# Patient Record
Sex: FEMALE | Race: BLACK OR AFRICAN AMERICAN | NOT HISPANIC OR LATINO | Employment: FULL TIME | URBAN - METROPOLITAN AREA
[De-identification: names, ages, dates, MRNs, and addresses within clinical notes are randomized per-mention and may not be internally consistent; named-entity substitution may affect disease eponyms.]

---

## 2020-04-19 ENCOUNTER — HOSPITAL ENCOUNTER (EMERGENCY)
Facility: CLINIC | Age: 25
Discharge: HOME | End: 2020-04-19
Attending: EMERGENCY MEDICINE | Admitting: EMERGENCY MEDICINE

## 2020-04-19 ENCOUNTER — APPOINTMENT (OUTPATIENT)
Dept: GENERAL RADIOLOGY | Facility: CLINIC | Age: 25
End: 2020-04-19
Attending: EMERGENCY MEDICINE

## 2020-04-19 VITALS
SYSTOLIC BLOOD PRESSURE: 91 MMHG | TEMPERATURE: 98.2 F | WEIGHT: 115 LBS | HEART RATE: 97 BPM | RESPIRATION RATE: 9 BRPM | DIASTOLIC BLOOD PRESSURE: 50 MMHG | OXYGEN SATURATION: 99 %

## 2020-04-19 DIAGNOSIS — T40.601A NARCOTIC OVERDOSE, ACCIDENTAL OR UNINTENTIONAL, INITIAL ENCOUNTER (H): ICD-10-CM

## 2020-04-19 LAB
ALBUMIN SERPL-MCNC: 3.5 G/DL (ref 3.4–5)
ALP SERPL-CCNC: 61 U/L (ref 40–150)
ALT SERPL W P-5'-P-CCNC: 28 U/L (ref 0–50)
ANION GAP SERPL CALCULATED.3IONS-SCNC: 6 MMOL/L (ref 3–14)
APAP SERPL-MCNC: <2 MG/L (ref 10–20)
APAP SERPL-MCNC: <2 MG/L (ref 10–20)
AST SERPL W P-5'-P-CCNC: 21 U/L (ref 0–45)
BILIRUB SERPL-MCNC: 0.3 MG/DL (ref 0.2–1.3)
BUN SERPL-MCNC: 7 MG/DL (ref 7–30)
CALCIUM SERPL-MCNC: 8.7 MG/DL (ref 8.5–10.1)
CHLORIDE SERPL-SCNC: 106 MMOL/L (ref 94–109)
CO2 SERPL-SCNC: 27 MMOL/L (ref 20–32)
CREAT SERPL-MCNC: 0.68 MG/DL (ref 0.52–1.04)
ETHANOL SERPL-MCNC: <0.01 G/DL
GFR SERPL CREATININE-BSD FRML MDRD: >90 ML/MIN/{1.73_M2}
GLUCOSE SERPL-MCNC: 151 MG/DL (ref 70–99)
POTASSIUM SERPL-SCNC: 3.1 MMOL/L (ref 3.4–5.3)
PROT SERPL-MCNC: 7 G/DL (ref 6.8–8.8)
SALICYLATES SERPL-MCNC: <2 MG/DL
SODIUM SERPL-SCNC: 139 MMOL/L (ref 133–144)

## 2020-04-19 PROCEDURE — 80329 ANALGESICS NON-OPIOID 1 OR 2: CPT | Performed by: EMERGENCY MEDICINE

## 2020-04-19 PROCEDURE — 25000128 H RX IP 250 OP 636: Performed by: EMERGENCY MEDICINE

## 2020-04-19 PROCEDURE — 80053 COMPREHEN METABOLIC PANEL: CPT | Performed by: EMERGENCY MEDICINE

## 2020-04-19 PROCEDURE — 96374 THER/PROPH/DIAG INJ IV PUSH: CPT

## 2020-04-19 PROCEDURE — 25000132 ZZH RX MED GY IP 250 OP 250 PS 637: Performed by: EMERGENCY MEDICINE

## 2020-04-19 PROCEDURE — 99285 EMERGENCY DEPT VISIT HI MDM: CPT | Mod: 25

## 2020-04-19 PROCEDURE — 80320 DRUG SCREEN QUANTALCOHOLS: CPT | Performed by: EMERGENCY MEDICINE

## 2020-04-19 PROCEDURE — 25800030 ZZH RX IP 258 OP 636: Performed by: EMERGENCY MEDICINE

## 2020-04-19 PROCEDURE — 93005 ELECTROCARDIOGRAM TRACING: CPT

## 2020-04-19 PROCEDURE — 71046 X-RAY EXAM CHEST 2 VIEWS: CPT

## 2020-04-19 PROCEDURE — 96361 HYDRATE IV INFUSION ADD-ON: CPT

## 2020-04-19 PROCEDURE — 25000132 ZZH RX MED GY IP 250 OP 250 PS 637

## 2020-04-19 RX ORDER — IBUPROFEN 200 MG
400 TABLET ORAL ONCE
Status: COMPLETED | OUTPATIENT
Start: 2020-04-19 | End: 2020-04-19

## 2020-04-19 RX ORDER — ONDANSETRON 2 MG/ML
4 INJECTION INTRAMUSCULAR; INTRAVENOUS ONCE
Status: COMPLETED | OUTPATIENT
Start: 2020-04-19 | End: 2020-04-19

## 2020-04-19 RX ORDER — ACETAMINOPHEN 325 MG/1
TABLET ORAL
Status: COMPLETED
Start: 2020-04-19 | End: 2020-04-19

## 2020-04-19 RX ADMIN — ACETAMINOPHEN 650 MG: 325 TABLET, FILM COATED ORAL at 20:52

## 2020-04-19 RX ADMIN — IBUPROFEN 400 MG: 200 TABLET, FILM COATED ORAL at 21:08

## 2020-04-19 RX ADMIN — SODIUM CHLORIDE 1000 ML: 9 INJECTION, SOLUTION INTRAVENOUS at 17:34

## 2020-04-19 RX ADMIN — ONDANSETRON 4 MG: 2 INJECTION INTRAMUSCULAR; INTRAVENOUS at 18:32

## 2020-04-19 ASSESSMENT — ENCOUNTER SYMPTOMS
VOMITING: 0
SHORTNESS OF BREATH: 1
APNEA: 1

## 2020-04-19 NOTE — ED AVS SNAPSHOT
[facility] Emergency Department  [address]  [facility] [address]  Phone:  [phone]  Fax:  [phone]                                    Mishel Acuna   MRN: [id number]    Department:  [facility] Emergency Department   Date of Visit:  4/19/2020           After Visit Summary Signature Page    I have received my discharge instructions, and my questions have been answered. I have discussed any challenges I see with this plan with the nurse or doctor.    ..........................................................................................................................................  Patient/Patient Representative Signature      ..........................................................................................................................................  Patient Representative Print Name and Relationship to Patient    ..................................................               ................................................  Date                                   Time    ..........................................................................................................................................  Reviewed by Signature/Title    ...................................................              ..............................................  Date                                               Time          22EPIC Rev 08/18

## 2020-04-19 NOTE — ED PROVIDER NOTES
History     Chief Complaint:  Overdose     The history is provided by the EMS personnel. The history is limited by the condition of the patient.      Mishel Acuna is a 25 year old female who presents with for an overdose. The patient, who is from [place], states that she was in the city visiting her Aunt. EMS states that the patient took 1 Percocet at a [employer] parking lot an hour prior to arrival (1630) with her sister. They state that the sister called EMS when the patient suddenly stopped breathing. EMS report that the patient was bagged for 7 minutes and given 0.5 mg IV of Narcan prior to arrival to the emergency department. The EMS voiced concern that the percocet was laced with Fentanyl based on other calls they have received recently. The patient currently complains of shortness of breath. She states that she was using the Percocet for recreational use and denies any suicidal ideations. She denies any other drug or alcohol use. The patient denies any recent illness or chance of pregnancy.      Allergies:  No known drug allergies    Medications:    The patient is not currently taking any prescribed medications.    Past Medical History:    The patient is not currently taking any prescribed medications.    Past Surgical History:    History reviewed. No pertinent surgical history.    Family History:    History reviewed. No pertinent family history.     Social History:  Smoking status: Unknown  Alcohol use: No  Drug use: Yes  The patient presents to the emergency department via EMS  PCP: No primary care provider on file.  Marital Status:  unkown    Review of Systems   Respiratory: Positive for apnea and shortness of breath.    Gastrointestinal: Negative for vomiting.   Psychiatric/Behavioral: Negative for self-injury.   10 point review of systems was obtained and negative other than mentioned above.    Physical Exam     Patient Vitals for the past 24 hrs:   BP Temp Temp src Pulse Heart Rate Resp SpO2 Weight    04/19/20 2200 91/50 -- -- 97 96 9 99 % --   04/19/20 2145 96/48 -- -- 98 96 16 100 % --   04/19/20 2130 103/52 -- -- 101 103 15 99 % --   04/19/20 2115 109/69 -- -- 92 100 11 99 % --   04/19/20 2100 107/72 -- -- 100 99 25 100 % --   04/19/20 2045 93/44 -- -- 95 96 9 98 % --   04/19/20 2030 97/53 -- -- 95 103 10 97 % --   04/19/20 2015 115/68 -- -- 99 104 8 94 % --   04/19/20 2000 116/74 -- -- 96 105 15 98 % --   04/19/20 1945 114/74 -- -- 93 99 14 100 % --   04/19/20 1915 108/62 -- -- 85 92 (!) 7 97 % --   04/19/20 1900 114/67 -- -- 96 93 10 92 % --   04/19/20 1845 113/76 -- -- 97 81 (!) 7 98 % --   04/19/20 1833 -- 98.2  F (36.8  C) Oral -- -- -- -- 52.2 kg (115 lb)   04/19/20 1832 119/70 -- -- 93 93 14 99 % --   04/19/20 1731 122/66 -- -- 127 127 -- 100 % --       Physical Exam   General: Sitting up in bed  Eyes:  The pupils are equal and round, pupils 5 mm and reactive bilaterally    Conjunctivae and sclerae are normal  ENT:    Moist mucous membranes  Neck:  Normal range of motion  CV:  Tachycardic rate and regular rhythm    Skin warm and well perfused   Resp:  Lungs are clear    Non-labored    No rales    No wheezing   MS:  Normal muscular tone  Skin:  No rash or acute skin lesions noted  Neuro:   Awake, alert.      Speech is normal and fluent.    Face is symmetric.     Moves all extremities equally  Psych: Anxious appearing    Emergency Department Course   ECG (17:33:46):  Rate 102 bpm. KS interval 120. QRS duration 82. QT/QTc 348/453. P-R-T axes 62 17 53. Sinus tachycardia. Otherwise normal ECG. Interpreted at 1740 by Yaritza Ivory MD.    Imaging:  Radiology findings were communicated with the patient who voiced understanding of the findings.    XR Chest 2 Views:   Impression: Negative chest.  As read by Radiology.    Laboratory:  Laboratory findings were communicated with the patient who voiced understanding of the findings.    Alcohol Level Blood: <0.01    CMP: Potassium: 3.1 (L), Glucose: 151  (H) o/w WNL (Creatinine 0.68)    Salicylate level: <2    Acetaminophen Level: <2     Interventions:  1734 NS 1L IV Bolus  1832 Zofran 4 mg IV    Emergency Department Course:  Past medical records, nursing notes, and vitals reviewed.  1730: I performed an exam of the patient and obtained history, as documented above.     EKG was obtained and reviewed in the emergency department.    IV inserted and blood drawn.    The patient was sent for a XR Chest 2 Views while in the emergency department, results above.     I rechecked the patient. I reviewed the results with the Patient and answered all related questions prior to discharge.     Findings and plan explained to the Patient. Patient discharged home with instructions regarding supportive care, medications, and reasons to return. The importance of close follow-up was reviewed.   Impression & Plan     Medical Decision Making:  Mishel Acuna is a 25 year old female who presented to the ED with overdose. Alert in ED. No additional narcan needed in ED. Monitored for 6 hours post ingestion with no worsening. Tylenol level negative x2 including 4 hour level. This was not suicide attempt. Was recreational use. No other ingestion. Patient discharged home and alert on discharge. Discussed to not use narcotics again.     Diagnosis:    ICD-10-CM   1. Narcotic overdose, accidental or unintentional, initial encounter (H)  T40.601A       Disposition:  Discharged to home.    Ibeth Watts  4/19/2020   [facility] EMERGENCY DEPARTMENT  Scribe Disclosure:  Ibeth HARPER, am serving as a scribe at 5:30 PM on 4/19/2020 to document services personally performed by Yaritza Ivory MD based on my observations and the provider's statements to me.        Yaritza Ivory MD  04/20/20 0129

## 2020-04-19 NOTE — ED NOTES
Bed: ED24  Expected date: 4/19/20  Expected time: 5:26 PM  Means of arrival: Ambulance  Comments:  BV2  25F  Overdose

## 2020-04-19 NOTE — ED TRIAGE NOTES
Pt arrives via EMS from [facility] after an overdose on percocet. Sister said she took only one. Concerns that it may have been laced with fentanyl- medics says they've had many calls there recently for other overdoses that ended up being laced with fentanyl or carfentanyl. In [facility]. Apneic. Bagged for 7 minutes. 0.5mg IV Narcan. Pt awake on arrival. No hx, no allergies, no medications. . Pt admits to using drugs in the past and today but denies any other drugs besides percocet today. Denies alcohol or chance of pregnancy. Pt using recreationally and denies suicidal thoughts.

## 2020-04-20 LAB — INTERPRETATION ECG - MUSE: NORMAL

## 2020-04-20 NOTE — ED NOTES
Pt brought in crackers and OJ per pt request with MD approval. Brought in warm blanket per pt request.

## 2020-04-20 NOTE — DISCHARGE INSTRUCTIONS
Don't take narcotics  You could have  today because the ambulance had to help you breath and give you medication to counteract the percocet

## 2020-08-15 ENCOUNTER — HOSPITAL ENCOUNTER (EMERGENCY)
Facility: CLINIC | Age: 25
Discharge: HOME | End: 2020-08-15
Attending: EMERGENCY MEDICINE | Admitting: EMERGENCY MEDICINE

## 2020-08-15 VITALS
HEART RATE: 104 BPM | RESPIRATION RATE: 24 BRPM | DIASTOLIC BLOOD PRESSURE: 68 MMHG | OXYGEN SATURATION: 97 % | TEMPERATURE: 98.5 F | SYSTOLIC BLOOD PRESSURE: 118 MMHG

## 2020-08-15 DIAGNOSIS — T40.601A NARCOTIC OVERDOSE, ACCIDENTAL OR UNINTENTIONAL, INITIAL ENCOUNTER (H): ICD-10-CM

## 2020-08-15 LAB
ALBUMIN SERPL-MCNC: 3.7 G/DL (ref 3.4–5)
ALP SERPL-CCNC: 71 U/L (ref 40–150)
ALT SERPL W P-5'-P-CCNC: 30 U/L (ref 0–50)
ANION GAP SERPL CALCULATED.3IONS-SCNC: 5 MMOL/L (ref 3–14)
APAP SERPL-MCNC: <2 MG/L (ref 10–20)
APAP SERPL-MCNC: <2 MG/L (ref 10–20)
AST SERPL W P-5'-P-CCNC: 26 U/L (ref 0–45)
B-HCG FREE SERPL-ACNC: <5 IU/L
BASOPHILS # BLD AUTO: 0 10E9/L (ref 0–0.2)
BASOPHILS NFR BLD AUTO: 0.4 %
BILIRUB SERPL-MCNC: 0.3 MG/DL (ref 0.2–1.3)
BUN SERPL-MCNC: 8 MG/DL (ref 7–30)
CALCIUM SERPL-MCNC: 8.5 MG/DL (ref 8.5–10.1)
CHLORIDE SERPL-SCNC: 107 MMOL/L (ref 94–109)
CO2 SERPL-SCNC: 25 MMOL/L (ref 20–32)
CREAT SERPL-MCNC: 0.81 MG/DL (ref 0.52–1.04)
DIFFERENTIAL METHOD BLD: NORMAL
EOSINOPHIL # BLD AUTO: 0 10E9/L (ref 0–0.7)
EOSINOPHIL NFR BLD AUTO: 0.8 %
ERYTHROCYTE [DISTWIDTH] IN BLOOD BY AUTOMATED COUNT: 14 % (ref 10–15)
ETHANOL SERPL-MCNC: <0.01 G/DL
GFR SERPL CREATININE-BSD FRML MDRD: >90 ML/MIN/{1.73_M2}
GLUCOSE SERPL-MCNC: 112 MG/DL (ref 70–99)
HCT VFR BLD AUTO: 39 % (ref 35–47)
HGB BLD-MCNC: 12.6 G/DL (ref 11.7–15.7)
IMM GRANULOCYTES # BLD: 0 10E9/L (ref 0–0.4)
IMM GRANULOCYTES NFR BLD: 0.2 %
LYMPHOCYTES # BLD AUTO: 2.9 10E9/L (ref 0.8–5.3)
LYMPHOCYTES NFR BLD AUTO: 58.9 %
MCH RBC QN AUTO: 29.4 PG (ref 26.5–33)
MCHC RBC AUTO-ENTMCNC: 32.3 G/DL (ref 31.5–36.5)
MCV RBC AUTO: 91 FL (ref 78–100)
MONOCYTES # BLD AUTO: 0.4 10E9/L (ref 0–1.3)
MONOCYTES NFR BLD AUTO: 7.7 %
NEUTROPHILS # BLD AUTO: 1.6 10E9/L (ref 1.6–8.3)
NEUTROPHILS NFR BLD AUTO: 32 %
NRBC # BLD AUTO: 0 10*3/UL
NRBC BLD AUTO-RTO: 0 /100
PLATELET # BLD AUTO: 221 10E9/L (ref 150–450)
POTASSIUM SERPL-SCNC: 3.5 MMOL/L (ref 3.4–5.3)
PROT SERPL-MCNC: 7.4 G/DL (ref 6.8–8.8)
RBC # BLD AUTO: 4.28 10E12/L (ref 3.8–5.2)
SALICYLATES SERPL-MCNC: <2 MG/DL
SODIUM SERPL-SCNC: 137 MMOL/L (ref 133–144)
WBC # BLD AUTO: 4.9 10E9/L (ref 4–11)

## 2020-08-15 PROCEDURE — 99285 EMERGENCY DEPT VISIT HI MDM: CPT | Mod: 25

## 2020-08-15 PROCEDURE — 25000132 ZZH RX MED GY IP 250 OP 250 PS 637: Performed by: EMERGENCY MEDICINE

## 2020-08-15 PROCEDURE — 25000128 H RX IP 250 OP 636: Performed by: EMERGENCY MEDICINE

## 2020-08-15 PROCEDURE — 85025 COMPLETE CBC W/AUTO DIFF WBC: CPT | Performed by: EMERGENCY MEDICINE

## 2020-08-15 PROCEDURE — 93005 ELECTROCARDIOGRAM TRACING: CPT

## 2020-08-15 PROCEDURE — 80320 DRUG SCREEN QUANTALCOHOLS: CPT | Performed by: EMERGENCY MEDICINE

## 2020-08-15 PROCEDURE — 80329 ANALGESICS NON-OPIOID 1 OR 2: CPT | Performed by: EMERGENCY MEDICINE

## 2020-08-15 PROCEDURE — 80053 COMPREHEN METABOLIC PANEL: CPT | Performed by: EMERGENCY MEDICINE

## 2020-08-15 PROCEDURE — 96374 THER/PROPH/DIAG INJ IV PUSH: CPT

## 2020-08-15 PROCEDURE — 96361 HYDRATE IV INFUSION ADD-ON: CPT

## 2020-08-15 PROCEDURE — 25800030 ZZH RX IP 258 OP 636: Performed by: EMERGENCY MEDICINE

## 2020-08-15 PROCEDURE — 84702 CHORIONIC GONADOTROPIN TEST: CPT

## 2020-08-15 RX ORDER — ACETAMINOPHEN 500 MG
1000 TABLET ORAL ONCE
Status: COMPLETED | OUTPATIENT
Start: 2020-08-15 | End: 2020-08-15

## 2020-08-15 RX ORDER — ONDANSETRON 2 MG/ML
4 INJECTION INTRAMUSCULAR; INTRAVENOUS EVERY 30 MIN PRN
Status: DISCONTINUED | OUTPATIENT
Start: 2020-08-15 | End: 2020-08-15 | Stop reason: HOSPADM

## 2020-08-15 RX ORDER — ONDANSETRON 4 MG/1
4 TABLET, ORALLY DISINTEGRATING ORAL ONCE
Status: COMPLETED | OUTPATIENT
Start: 2020-08-15 | End: 2020-08-15

## 2020-08-15 RX ORDER — SODIUM CHLORIDE, SODIUM LACTATE, POTASSIUM CHLORIDE, CALCIUM CHLORIDE 600; 310; 30; 20 MG/100ML; MG/100ML; MG/100ML; MG/100ML
INJECTION, SOLUTION INTRAVENOUS CONTINUOUS
Status: DISCONTINUED | OUTPATIENT
Start: 2020-08-15 | End: 2020-08-15

## 2020-08-15 RX ADMIN — ONDANSETRON 4 MG: 4 TABLET, ORALLY DISINTEGRATING ORAL at 04:29

## 2020-08-15 RX ADMIN — ACETAMINOPHEN 1000 MG: 500 TABLET, FILM COATED ORAL at 04:35

## 2020-08-15 RX ADMIN — SODIUM CHLORIDE 1000 ML: 9 INJECTION, SOLUTION INTRAVENOUS at 01:17

## 2020-08-15 RX ADMIN — ONDANSETRON 4 MG: 2 INJECTION INTRAMUSCULAR; INTRAVENOUS at 01:55

## 2020-08-15 NOTE — ED TRIAGE NOTES
Pt presents via EMS. Per medics, pt was with friends in a parking lot. Pt had snorted a percocet tab that's combined with fentanyl. Upon EMS arrival pt had RR of 0, with strong pulse. Medics administered 0.5 IV Narcan and pt aroused.  Pt also received 4mg PO Zofran for nausea.  Pt reports she took 1/2 percocet tablet around 2230 due to shoulder pain, denies snorting any substances.  ABCs intact, A&Ox4.

## 2020-08-15 NOTE — ED AVS SNAPSHOT
[facility] Emergency Department  [address]  [facility] [address]  Phone:  [phone]  Fax:  [phone]                                    Mishel Acuna   MRN: [id number]    Department:  [facility] Emergency Department   Date of Visit:  8/15/2020           After Visit Summary Signature Page    I have received my discharge instructions, and my questions have been answered. I have discussed any challenges I see with this plan with the nurse or doctor.    ..........................................................................................................................................  Patient/Patient Representative Signature      ..........................................................................................................................................  Patient Representative Print Name and Relationship to Patient    ..................................................               ................................................  Date                                   Time    ..........................................................................................................................................  Reviewed by Signature/Title    ...................................................              ..............................................  Date                                               Time          22EPIC Rev 08/18

## 2020-08-15 NOTE — ED PROVIDER NOTES
"History     Chief Complaint:  Ingestion       HPI  Mishel Acuna is a 25 year old female with a history of past narcotic overdose who presents for evaluation of presumed overdose of narcotics.  She reports that she took 1/2 tablet of Percocet at approximately 11:30 PM from a known person she prefers not to identify.  Per EMS there was a report that she may have snorted a medication and was found down and unresponsive with pulses.  She was given Narcan and was immediately responsive.  Here in the emergency department she denies any symptoms.  She notes she is not trying to harm herself.  She notes this was \"bad luck\".  She notes she occasionally uses pain medicines for her right shoulder which bothers her due to past injury.  She denies suicidality or homicidality.  She denies any other drugs of abuse.    Allergies:  No Known Drug Allergies      Medications:   Medications reviewed. No pertinent medications.    Medical History:   Past medical history reviewed. No pertinent medical history.     Surgical History   Surgical history reviewed. No pertinent surgical history.     Family History:   Family history reviewed. No pertinent family history.     Social History:  Patient presents via EMS.  Patient positive for drug use.    Nonsmoker.    Review of Systems   Psychiatric/Behavioral: Negative for suicidal ideas (homicidal neg).   All other systems reviewed and are negative.        Physical Exam     Patient Vitals for the past 24 hrs:   BP Temp Temp src Pulse Heart Rate Resp SpO2   08/15/20 0040 -- -- -- -- 98 14 100 %   08/15/20 0035 (!) 124/105 98.5  F (36.9  C) Oral 100 -- -- 99 %          Physical Exam  General: Thin adult female sitting upright  Eyes: PERRL, Conjunctive within normal limits. No scleral icterus.  ENT: Moist mucous membranes, oropharynx clear.   CV: Normal S1S2, no murmur, rub or gallop. Regular rate and rhythm  Resp: Clear to auscultation bilaterally. Normal respiratory effort.  GI: Abdomen is soft, " nontender and nondistended.   MSK:  Nontender. Normal active range of motion.  Skin: Warm and dry. No rashes or lesions or ecchymoses on visible skin.  Neuro: Alert and oriented. Responds appropriately to all questions and commands. No focal findings appreciated. Normal muscle tone.  Psych: Teary. Calm and cooperative.    Emergency Department Course     ECG:  ECG taken at 0112  Normal sinus rhythm  Normal ECG  Rate 86 bpm. VT interval 132 ms. QRS duration 80 ms. QT/QTc 380/454 ms. P-R-T axes 59 13 39.      Laboratory:  Laboratory findings were communicated with the patient who voiced understanding of the findings.    0056 Acetaminophen level: <2  0342 Acetaminophen level: <2  Salicylate level: <2  EtOH: <0.01    ISTAT HCG Quantitative: <5.0    CBC: WBC 4.9, HGB 12.6,   CMP: Glucose 112 (H) (Creatinine 0.81) o/w WNL     Interventions:   0117 NS 1000 mL IV   0155 Zofran 4 mg IV   0429 Zofran 4 mg IV   0435 Tylenol 1000 mg PO      Emergency Department Course:    0032 Nursing notes and vitals reviewed.    0040 I performed an exam of the patient as documented above.     0056 IV was inserted and blood was drawn for laboratory testing, results above.    Patient reassessed. She is nauseous and vomited. She notes a headache now.     Patient reassessed. Able to tolerate po. Denies new concerns. Feels comfortable with plan for discharge home.     0437 Findings and plan explained to the Patient. Patient discharged home with instructions regarding supportive care, medications, and reasons to return. The importance of close follow-up was reviewed.     Impression & Plan       Medical Decision Making:  Mishel Acuna is a 25-year-old female seen here in the emergency department in the past for opioid overdose who presents emergency department presumed narcotic overdose.  She had response to Narcan.  She had no decompensation over her stay.  She reported taking half tablet of Percocet and there were concerns from EMS for possible  fentanyl laced Percocet being distributed.  She denies any other ingestions.  Laboratory evaluation is unremarkable here including 4-hour Tylenol level.  She did have nausea with vomiting and headache later in her stay.  No focal neurologic deficits or concern for new intracranial process.  She had Tylenol is able to take p.o. without difficulty later in her stay.  After lengthy observation stay in the emergency department she is currently stable.  No concerns for intentional dose.  She is recommended to avoid further narcotics.  She notes this is only the second time she has taken Percocet and both times this is happened to her so she plans on avoiding future use.  All question answered prior to discharge in the care of a family member.      Diagnosis:     ICD-10-CM    1. Narcotic overdose, accidental or unintentional, initial encounter (H)  T40.601A         Disposition:  Discharged to home.    Scribe Disclosure:  Jarvis HARPER, am serving as a scribe at 12:48 AM on 8/15/2020 to document services personally performed by Katya Carrasquillo MD based on my observations and the provider's statements to me.          Katya Carrasquillo MD  08/15/20 0674

## 2020-08-17 LAB — INTERPRETATION ECG - MUSE: NORMAL

## 2021-04-14 ENCOUNTER — HOSPITAL ENCOUNTER (EMERGENCY)
Facility: CLINIC | Age: 26
Discharge: HOME | End: 2021-04-14
Attending: EMERGENCY MEDICINE | Admitting: EMERGENCY MEDICINE

## 2021-04-14 ENCOUNTER — APPOINTMENT (OUTPATIENT)
Dept: GENERAL RADIOLOGY | Facility: CLINIC | Age: 26
End: 2021-04-14
Attending: EMERGENCY MEDICINE

## 2021-04-14 VITALS
RESPIRATION RATE: 16 BRPM | OXYGEN SATURATION: 100 % | TEMPERATURE: 97.4 F | SYSTOLIC BLOOD PRESSURE: 122 MMHG | HEART RATE: 99 BPM | DIASTOLIC BLOOD PRESSURE: 84 MMHG | WEIGHT: 129 LBS

## 2021-04-14 DIAGNOSIS — M54.50 ACUTE RIGHT-SIDED LOW BACK PAIN WITHOUT SCIATICA: ICD-10-CM

## 2021-04-14 PROCEDURE — 250N000013 HC RX MED GY IP 250 OP 250 PS 637: Performed by: EMERGENCY MEDICINE

## 2021-04-14 PROCEDURE — 73502 X-RAY EXAM HIP UNI 2-3 VIEWS: CPT

## 2021-04-14 PROCEDURE — 99283 EMERGENCY DEPT VISIT LOW MDM: CPT

## 2021-04-14 RX ORDER — IBUPROFEN 200 MG
400 TABLET ORAL ONCE
Status: COMPLETED | OUTPATIENT
Start: 2021-04-14 | End: 2021-04-14

## 2021-04-14 RX ADMIN — IBUPROFEN 400 MG: 200 TABLET, FILM COATED ORAL at 16:44

## 2021-04-14 ASSESSMENT — ENCOUNTER SYMPTOMS
NUMBNESS: 0
BACK PAIN: 1

## 2021-04-14 NOTE — DISCHARGE INSTRUCTIONS
Avoid heavy lifting, nothing more than 5 lbs until symptoms improve.    Alternate tylenol or ibuprofen as needed for pain    Continue with activities as able    Follow-up with your primary care provider in 2-3 days if symptoms persist or worsen    Return to ER with severe pain, loss of control of bowel or bladder function, numbness to your groin, or any other concerns.    Discharge Instructions  Back Pain  You were seen today for back pain. Back pain can have many causes, but most will get better without surgery or other specific treatment. Sometimes there is a herniated ( slipped ) disc. We do not usually do MRI scans to look for these right away, since most herniated discs will get better on their own with time.  Today, we did not find any evidence that your back pain was caused by a serious condition. However, sometimes symptoms develop over time and cannot be found during an emergency visit, so it is very important that you follow up with your primary provider.  Generally, every Emergency Department visit should have a follow-up clinic visit with either a primary or a specialty clinic/provider. Please follow-up as instructed by your emergency provider today.    Return to the Emergency Department if:  You develop a fever with your back pain.   You have weakness or change in sensation in one or both legs.  You lose control of your bowels or bladder, or cannot empty your bladder (cannot pee).  Your pain gets much worse.     Follow-up with your provider:  Unless your pain has completely gone away, please make an appointment with your provider within one week. Most of the routine care for back pain is available in a clinic and not the Emergency Department. You may need further management of your back pain, such as more pain medication, imaging such as an X-ray or MRI, or physical therapy.    What can I do to help myself?  Remain Active -- People are often afraid that they will hurt their back further or delay  recovery by remaining active, but this is one of the best things you can do for your back. In fact, staying in bed for a long time to rest is not recommended. Studies have shown that people with low back pain recover faster when they remain active. Movement helps to bring blood flow to the muscles and relieve muscle spasms as well as preventing loss of muscle strength.  Heat -- Using a heating pad can help with low back pain during the first few weeks. Do not sleep with a heating pad, as you can be burned.   Pain medications - You may take a pain medication such as Tylenol  (acetaminophen), Advil , Motrin  (ibuprofen) or Aleve  (naproxen).  If you were given a prescription for medicine here today, be sure to read all of the information (including the package insert) that comes with your prescription.  This will include important information about the medicine, its side effects, and any warnings that you need to know about.  The pharmacist who fills the prescription can provide more information and answer questions you may have about the medicine.  If you have questions or concerns that the pharmacist cannot address, please call or return to the Emergency Department.   Remember that you can always come back to the Emergency Department if you are not able to see your regular provider in the amount of time listed above, if you get any new symptoms, or if there is anything that worries you.

## 2021-04-14 NOTE — ED PROVIDER NOTES
History   Chief Complaint:  Back pain      HPI   Mishel Acuna is a 26 year old female who presents with back pain. The patient developed right lower back pain yesterday after she bent down with her knees and back up while at work. She felt a pop in the right lower back and has had pain in the area since then. 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 The back pain does not radiate down the right leg. She was not lifting anything at the time. She has had no numbness, tingling, or incontinence.  Denies saddle anesthesia.  No associated abdominal pain.  Denies possibility of pregnancy.  She has tried using Tylenol and Icy/Hot patches without significant improvement.     Review of Systems   Musculoskeletal: Positive for back pain (right lower).   Neurological: Negative for numbness.        No tingling  No incontinence   All other systems reviewed and are negative.       Allergies:  No Known Allergies    Medications:  The patient is not on any medications.    Past Medical History:    The patient has no known medical problems.     Social History:  The patient presents alone.   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     Physical Exam     Patient Vitals for the past 24 hrs:   BP Temp Temp src Pulse Resp SpO2 Weight   04/14/21 1609 122/84 97.4  F (36.3  C) Temporal 99 16 100 % 58.5 kg (129 lb)       Physical Exam  General:   Well-nourished   Speaking in full sentences  Eyes:   Conjunctiva without injection or scleral icterus  ENT:   Moist mucous membranes   Nares patent   Pinnae normal  Neck:   Full ROM   No stiffness appreciated  Resp:   Lungs CTAB   No crackles, wheezing or audible rubs   Good air movement  CV:    Normal rate, regular rhythm   S1 and S2 present   No murmur, gallop or rub  GI:   BS present   Abdomen soft without distention   Non-tender to light and deep palpation   Specifically, no RLQ or LLQ pain   No CVA tenderness   No guarding or rebound tenderness  Skin:   Warm, dry, well  perfused   No rashes or open wounds on exposed skin  MSK:   Moves all extremities   Tenderness to palpation to R lower lumbar paraspinal musculature   No associated spasm   No overlying skin changes  Neuro:   Alert   5/5 ankle dorsi/plantarflexioin   5/5 hip flexion   SILT in BLE   2+ patellar reflexes bilaterally   Answers questions appropriately   Moves all extremities equally   Antalgic gait 2/2 pain  Psych:   Normal affect, normal mood      Emergency Department Course     Imaging:  XR Pelvis and Hip Right 1 View  Final Result  IMPRESSION: Negative exam.  DOMINIC MCCONNELL MD  Reading per radiology     Emergency Department Course:    Reviewed:  I reviewed the patient's nursing notes, vitals, past medical records, Care Everywhere.     Assessments:  1632  I performed an exam of the patient as documented above.   1710 Patient rechecked and updated.     Interventions:  1644 Ibuprofen 400 mg oral     Disposition:  Discharged to home.      Impression & Plan     Medical Decision Making:  Mishel Acuna is a 26-year-old female presenting to the ED for evaluation of hip pain/low back pain.  VS on presentation reveal mild tachycardia though otherwise unremarkable.  History and examination as outlined above.  High suspicion for musculoligamentous strain given the onset of pain after bending over and standing up.  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  She does note mild radiation of pain towards her buttock, which does raise the possibility of lumbosacral radiculopathy.  At this time, no objective motor or sensory deficits are noted to lower extremities.  No associated saddle anesthesia nor bowel or bladder incontinence to suggest cauda equina/acute cord compression.  No overlying skin changes are noted to suggest zoster.  No constitutional symptoms, or overlying skin changes to suggest acute infectious process such as cellulitis, epidural abscess, osteomyelitis, nor discitis.  X-ray of the pelvis/right hip were  negative for acute fracture nor dislocation.  Extremities are otherwise warm and well-perfused without signs to suggest acute vascular process.  Patient finds ibuprofen with some improvements in pain.  Results and clinical impression were discussed.  Recommended weightbearing as able, avoidance of heavy lifting (nothing more than 5 pounds until symptoms improve), and alternating Tylenol/ibuprofen.  Follow-up with PCP in 2 to 3 days if symptoms persist.  Return to ER with severe pain, loss of bowel or bladder function, lower extremity numbness, weakness, or any other concerns.  Patient felt comfortable with plan of care.  All questions were answered prior to discharge.      Diagnosis:    ICD-10-CM    1. Acute right-sided low back pain without sciatica  M54.5        Scribe Disclosure:  I, Konstantin Guardado, am serving as a scribe at 4:32 PM on 4/14/2021 to document services personally performed by Doyle Arroyo MD based on my observations and the provider's statements to me.         Doyle Arroyo MD  04/14/21 0974

## 2021-04-14 NOTE — ED TRIAGE NOTES
A&O x4, ABCs intact. Pt presents with right hip pain. Pt states that she was bending over at work last night and she felt something pop. Pt is able to bear weight. She is wearing an icy hot patch and took tylenol without relief.

## 2021-04-14 NOTE — LETTER
April 14, 2021      To Whom It May Concern:      Mishel Acuna was seen in our Emergency Department today, 04/14/21.  She is not able to lift more than 5 lbs over the next 3 days, or until cleared by her primary doctor.     Sincerely,        Srikanth Edwards RN

## 2022-11-12 ENCOUNTER — APPOINTMENT (OUTPATIENT)
Dept: GENERAL RADIOLOGY | Facility: CLINIC | Age: 27
End: 2022-11-12
Attending: PHYSICIAN ASSISTANT

## 2022-11-12 ENCOUNTER — HOSPITAL ENCOUNTER (EMERGENCY)
Facility: CLINIC | Age: 27
Discharge: HOME | End: 2022-11-12
Attending: PHYSICIAN ASSISTANT | Admitting: PHYSICIAN ASSISTANT

## 2022-11-12 VITALS
OXYGEN SATURATION: 98 % | RESPIRATION RATE: 20 BRPM | DIASTOLIC BLOOD PRESSURE: 63 MMHG | HEART RATE: 104 BPM | TEMPERATURE: 98.9 F | SYSTOLIC BLOOD PRESSURE: 108 MMHG

## 2022-11-12 DIAGNOSIS — J18.9 PNEUMONIA OF LEFT LOWER LOBE DUE TO INFECTIOUS ORGANISM: ICD-10-CM

## 2022-11-12 DIAGNOSIS — J10.1 INFLUENZA A: ICD-10-CM

## 2022-11-12 LAB
ANION GAP SERPL CALCULATED.3IONS-SCNC: 15 MMOL/L (ref 7–15)
BASOPHILS # BLD AUTO: 0 10E3/UL (ref 0–0.2)
BASOPHILS NFR BLD AUTO: 0 %
BUN SERPL-MCNC: 6.8 MG/DL (ref 6–20)
CALCIUM SERPL-MCNC: 8.8 MG/DL (ref 8.6–10)
CHLORIDE SERPL-SCNC: 98 MMOL/L (ref 98–107)
CREAT SERPL-MCNC: 0.73 MG/DL (ref 0.51–0.95)
DEPRECATED HCO3 PLAS-SCNC: 22 MMOL/L (ref 22–29)
EOSINOPHIL # BLD AUTO: 0 10E3/UL (ref 0–0.7)
EOSINOPHIL NFR BLD AUTO: 0 %
ERYTHROCYTE [DISTWIDTH] IN BLOOD BY AUTOMATED COUNT: 17.9 % (ref 10–15)
FLUAV RNA SPEC QL NAA+PROBE: POSITIVE
FLUBV RNA RESP QL NAA+PROBE: NEGATIVE
GFR SERPL CREATININE-BSD FRML MDRD: >90 ML/MIN/1.73M2
GLUCOSE SERPL-MCNC: 93 MG/DL (ref 70–99)
HCG SERPL QL: NEGATIVE
HCT VFR BLD AUTO: 43.7 % (ref 35–47)
HGB BLD-MCNC: 13.7 G/DL (ref 11.7–15.7)
IMM GRANULOCYTES # BLD: 0.5 10E3/UL
IMM GRANULOCYTES NFR BLD: 4 %
LYMPHOCYTES # BLD AUTO: 1 10E3/UL (ref 0.8–5.3)
LYMPHOCYTES NFR BLD AUTO: 7 %
MCH RBC QN AUTO: 25.8 PG (ref 26.5–33)
MCHC RBC AUTO-ENTMCNC: 31.4 G/DL (ref 31.5–36.5)
MCV RBC AUTO: 82 FL (ref 78–100)
MONOCYTES # BLD AUTO: 0.3 10E3/UL (ref 0–1.3)
MONOCYTES NFR BLD AUTO: 2 %
NEUTROPHILS # BLD AUTO: 12.4 10E3/UL (ref 1.6–8.3)
NEUTROPHILS NFR BLD AUTO: 87 %
NRBC # BLD AUTO: 0 10E3/UL
NRBC BLD AUTO-RTO: 0 /100
PLATELET # BLD AUTO: 172 10E3/UL (ref 150–450)
POTASSIUM SERPL-SCNC: 3.3 MMOL/L (ref 3.4–5.3)
RBC # BLD AUTO: 5.31 10E6/UL (ref 3.8–5.2)
RSV RNA SPEC NAA+PROBE: NEGATIVE
SARS-COV-2 RNA RESP QL NAA+PROBE: NEGATIVE
SODIUM SERPL-SCNC: 135 MMOL/L (ref 136–145)
TROPONIN T SERPL HS-MCNC: <6 NG/L
WBC # BLD AUTO: 14.2 10E3/UL (ref 4–11)

## 2022-11-12 PROCEDURE — 93005 ELECTROCARDIOGRAM TRACING: CPT | Mod: RTG

## 2022-11-12 PROCEDURE — C9803 HOPD COVID-19 SPEC COLLECT: HCPCS

## 2022-11-12 PROCEDURE — 71046 X-RAY EXAM CHEST 2 VIEWS: CPT

## 2022-11-12 PROCEDURE — 80048 BASIC METABOLIC PNL TOTAL CA: CPT | Performed by: PHYSICIAN ASSISTANT

## 2022-11-12 PROCEDURE — 87637 SARSCOV2&INF A&B&RSV AMP PRB: CPT | Performed by: PHYSICIAN ASSISTANT

## 2022-11-12 PROCEDURE — 36415 COLL VENOUS BLD VENIPUNCTURE: CPT | Performed by: PHYSICIAN ASSISTANT

## 2022-11-12 PROCEDURE — 258N000003 HC RX IP 258 OP 636: Performed by: PHYSICIAN ASSISTANT

## 2022-11-12 PROCEDURE — 96361 HYDRATE IV INFUSION ADD-ON: CPT

## 2022-11-12 PROCEDURE — 85025 COMPLETE CBC W/AUTO DIFF WBC: CPT | Performed by: PHYSICIAN ASSISTANT

## 2022-11-12 PROCEDURE — 96374 THER/PROPH/DIAG INJ IV PUSH: CPT

## 2022-11-12 PROCEDURE — 99285 EMERGENCY DEPT VISIT HI MDM: CPT | Mod: 25

## 2022-11-12 PROCEDURE — 84703 CHORIONIC GONADOTROPIN ASSAY: CPT | Performed by: PHYSICIAN ASSISTANT

## 2022-11-12 PROCEDURE — 250N000011 HC RX IP 250 OP 636: Performed by: PHYSICIAN ASSISTANT

## 2022-11-12 PROCEDURE — 84484 ASSAY OF TROPONIN QUANT: CPT | Performed by: PHYSICIAN ASSISTANT

## 2022-11-12 RX ORDER — ONDANSETRON 2 MG/ML
4 INJECTION INTRAMUSCULAR; INTRAVENOUS ONCE
Status: COMPLETED | OUTPATIENT
Start: 2022-11-12 | End: 2022-11-12

## 2022-11-12 RX ORDER — DOXYCYCLINE 100 MG/1
100 CAPSULE ORAL 2 TIMES DAILY
Qty: 10 CAPSULE | Refills: 0 | Status: SHIPPED | OUTPATIENT
Start: 2022-11-12 | End: 2022-11-17

## 2022-11-12 RX ORDER — ONDANSETRON 4 MG/1
4 TABLET, ORALLY DISINTEGRATING ORAL EVERY 8 HOURS PRN
Qty: 10 TABLET | Refills: 0 | Status: SHIPPED | OUTPATIENT
Start: 2022-11-12 | End: 2022-11-15

## 2022-11-12 RX ADMIN — SODIUM CHLORIDE 500 ML: 9 INJECTION, SOLUTION INTRAVENOUS at 21:57

## 2022-11-12 RX ADMIN — ONDANSETRON 4 MG: 2 INJECTION INTRAMUSCULAR; INTRAVENOUS at 21:58

## 2022-11-12 ASSESSMENT — ENCOUNTER SYMPTOMS
VOMITING: 1
RHINORRHEA: 1
COUGH: 1

## 2022-11-12 ASSESSMENT — ACTIVITIES OF DAILY LIVING (ADL)
ADLS_ACUITY_SCORE: 35
ADLS_ACUITY_SCORE: 33

## 2022-11-13 NOTE — ED PROVIDER NOTES
History     Chief Complaint:  Cough       HPI   Mishel Acuna is a 27 year old female who presents with cough, runny nose, and chest pain. Cough and runny nose have been present for 1.5 weeks. Patient developed a left-sided stabbing chest pain last night that persisted into today. Patient's aunt was recently ill with pneumonia. She has reduced oral intake and had several episodes of vomiting this week. Urinating regularly. No rash. Patient current with immunizations. No cardiac or pulmonary disease.    ROS:  Review of Systems   HENT: Positive for rhinorrhea.    Respiratory: Positive for cough.    Cardiovascular: Positive for chest pain.   Gastrointestinal: Positive for vomiting.   All other systems reviewed and are negative.    Allergies:  No Known Allergies     Medications:    amoxicillin-clavulanate (AUGMENTIN) 875-125 MG tablet  doxycycline hyclate (VIBRAMYCIN) 100 MG capsule  ondansetron (ZOFRAN ODT) 4 MG ODT tab        Past Medical History:    History reviewed. No pertinent past medical history.    Past Surgical History:    History reviewed. No pertinent surgical history.     Family History:    family history is not on file.    Social History:     PCP: [facility]     Physical Exam     Patient Vitals for the past 24 hrs:   BP Temp Temp src Pulse Resp SpO2   11/12/22 1840 120/75 98.9  F (37.2  C) Tympanic (!) 129 20 99 %        Physical Exam  Constitutional: Alert, attentive, GCS 15  HENT:    Nose: Nasal congestion.   Mouth/Throat: Oropharynx is clear, mucous membranes are moist   Eyes: EOM are normal.   CV: regular rate and rhythm; no murmurs, rubs or gallups  Chest: Effort normal and breath sounds normal. No wheezing. Crackles in left lung field. Productive cough.  GI:  There is no tenderness. No distension. Normal bowel sounds  MSK: Normal range of motion.   Neurological: Alert, attentive  Skin: Skin is warm and dry.      Emergency Department Course   ECG:  ECG results from 08/15/20   EKG 12-lead, tracing only      Value    Interpretation ECG Click View Image link to view waveform and result       Imaging:  XR Chest 2 Views   Final Result   IMPRESSION: Consolidation with air bronchograms is present in the left lower lobe, consistent with lobar pneumonia         Report per radiology    Laboratory:  Labs Ordered and Resulted from Time of ED Arrival to Time of ED Departure   BASIC METABOLIC PANEL - Abnormal       Result Value    Sodium 135 (*)     Potassium 3.3 (*)     Chloride 98      Carbon Dioxide (CO2) 22      Anion Gap 15      Urea Nitrogen 6.8      Creatinine 0.73      Calcium 8.8      Glucose 93      GFR Estimate >90     INFLUENZA A/B & SARS-COV2 PCR MULTIPLEX - Abnormal    Influenza A PCR Positive (*)     Influenza B PCR Negative      RSV PCR Negative      SARS CoV2 PCR Negative     CBC WITH PLATELETS AND DIFFERENTIAL - Abnormal    WBC Count 14.2 (*)     RBC Count 5.31 (*)     Hemoglobin 13.7      Hematocrit 43.7      MCV 82      MCH 25.8 (*)     MCHC 31.4 (*)     RDW 17.9 (*)     Platelet Count 172      % Neutrophils 87      % Lymphocytes 7      % Monocytes 2      % Eosinophils 0      % Basophils 0      % Immature Granulocytes 4      NRBCs per 100 WBC 0      Absolute Neutrophils 12.4 (*)     Absolute Lymphocytes 1.0      Absolute Monocytes 0.3      Absolute Eosinophils 0.0      Absolute Basophils 0.0      Absolute Immature Granulocytes 0.5 (*)     Absolute NRBCs 0.0     TROPONIN T, HIGH SENSITIVITY - Normal    Troponin T, High Sensitivity <6     HCG QUALITATIVE PREGNANCY - Normal    hCG Serum Qualitative Negative        Emergency Department Course:    Reviewed:  I reviewed nursing notes, vitals and past medical history    Assessments:  2100 I obtained history and examined the patient as noted above.   2300 I rechecked the patient and explained imaging and laboratory results.      Interventions:  Medications   0.9% sodium chloride BOLUS (500 mLs Intravenous New Bag 11/12/22 4116)   ondansetron (ZOFRAN) injection 4 mg  (4 mg Intravenous Given 11/12/22 2158)        Disposition:  The patient was discharged to home.     Impression & Plan    CMS Diagnoses: None    Medical Decision Making:  Patient is an ill but nontoxic appearing 28 yo F who presents for cough and fever of 1.5 weeks with recent onset of sharp left-sided chest pain. She is afebrile, normotensive with tachycardic HR. Physical examination reveals crackles in left lung field and nasal congestion. Differential diagnosis includes URI, pneumonia, PE.  Preliminary labs obtained. Leukocytosis present at 14.2 with left shift. Mild hyponatremia at 134 and hypokalemia at 3.3 consistent with recent GI loss. Viral swab positive for influenza.  EKG demonstrates tachycardic rhythm at 118. No ST elevation or depression. Troponin within normal limits.  Patient does not pass PERC due to elevated HR. However using Well's criteria, patient is low risk for PE. Tachycardia is likely attributed to illness.  CXR demonstrates left lower lobe pneumonia.   Results were reviewed and discussed with patient. Patient's symptoms consistent with viral infection with secondary bacterial pneumonia. There is slight electrolyte imbalances but no gross abnormality. Using CURB-65, patient is low-risk and may be treated as outpatient. Prescriptions for both doxycycline and Augmentin sent. Supportive cares and return precautions to ED discussed. Patient expressed understanding of plan and was ready for discharge.    Diagnosis:    ICD-10-CM    1. Pneumonia of left lower lobe due to infectious organism  J18.9       2. Influenza A  J10.1            Discharge Medications:  New Prescriptions    AMOXICILLIN-CLAVULANATE (AUGMENTIN) 875-125 MG TABLET    Take 1 tablet by mouth 2 times daily for 10 days    DOXYCYCLINE HYCLATE (VIBRAMYCIN) 100 MG CAPSULE    Take 1 capsule (100 mg) by mouth 2 times daily for 5 days    ONDANSETRON (ZOFRAN ODT) 4 MG ODT TAB    Take 1 tablet (4 mg) by mouth every 8 hours as needed for  nausea or vomiting        11/12/2022   Piedad Jaramillo PA-C Steinbrueck, Emily, PA-C  11/12/22 8699

## 2022-11-13 NOTE — DISCHARGE INSTRUCTIONS
Discharge Instructions  Bronchitis, Pneumonia, Bronchospasm    You were seen today for a chest infection or inflammation. If your provider decided this was due to a bacterial infection, you may need an antibiotic. Sometimes these are caused by a virus, and then an antibiotic will not help.     Generally, every Emergency Department visit should have a follow-up clinic visit with either a primary or a specialty clinic/provider. Please follow-up as instructed by your emergency provider today.    Return to the Emergency Department if:  Your breathing gets much worse.  You are very weak, or feel much more ill.  You develop new symptoms, such as chest pain.  You cough up blood.  You are vomiting (throwing up) enough that you cannot keep fluids or your medicine down.    What can I do to help myself?  Fill any prescriptions the provider gave you and take them right away--especially antibiotics. Be sure to finish the whole antibiotic prescription.  You may be given a prescription for an inhaler, which can help loosen tight air passages.  Use this as needed, but not more often than directed. Inhalers work much better when used with a spacer.   You may be given a prescription for a steroid to reduce inflammation. Used long-term, these can have side effects, but for short-term use they are safe. You may notice restlessness or increased appetite.      You may use non-prescription cough or cold medicines. Cough medicines may help, but don t make the cough go away completely.   Avoid smoke, because this can make your symptoms worse. If you smoke, this may be a good time to quit! Consider using nicotine lozenges, gum, or patches to reduce cravings.   If you have a fever, Tylenol  (acetaminophen), Motrin  (ibuprofen), or Advil  (ibuprofen) may help bring fever down and may help you feel more comfortable. Be sure to read and follow the package directions, and ask your provider if you have questions.  Be sure to get your flu shot each  year.  For certain ages, the pneumonia shot can help prevent pneumonia.  If you were given a prescription for medicine here today, be sure to read all of the information (including the package insert) that comes with your prescription.  This will include important information about the medicine, its side effects, and any warnings that you need to know about.  The pharmacist who fills the prescription can provide more information and answer questions you may have about the medicine.  If you have questions or concerns that the pharmacist cannot address, please call or return to the Emergency Department.     Remember that you can always come back to the Emergency Department if you are not able to see your regular provider in the amount of time listed above, if you get any new symptoms, or if there is anything that worries you.

## 2022-11-13 NOTE — ED TRIAGE NOTES
Patient states she has had a cough, headache, and chest pain for 2 days. Patient states her pain is a 8/10     Triage Assessment     Row Name 11/12/22 6810       Triage Assessment (Adult)    Airway WDL WDL       Respiratory WDL    Respiratory WDL WDL       Skin Circulation/Temperature WDL    Skin Circulation/Temperature WDL WDL       Cardiac WDL    Cardiac WDL WDL       Peripheral/Neurovascular WDL    Peripheral Neurovascular WDL WDL

## 2022-11-14 LAB
ATRIAL RATE - MUSE: 118 BPM
DIASTOLIC BLOOD PRESSURE - MUSE: NORMAL MMHG
INTERPRETATION ECG - MUSE: NORMAL
P AXIS - MUSE: 65 DEGREES
PR INTERVAL - MUSE: 130 MS
QRS DURATION - MUSE: 84 MS
QT - MUSE: 302 MS
QTC - MUSE: 423 MS
R AXIS - MUSE: 12 DEGREES
SYSTOLIC BLOOD PRESSURE - MUSE: NORMAL MMHG
T AXIS - MUSE: 59 DEGREES
VENTRICULAR RATE- MUSE: 118 BPM